# Patient Record
Sex: MALE | Race: WHITE | Employment: FULL TIME
[De-identification: names, ages, dates, MRNs, and addresses within clinical notes are randomized per-mention and may not be internally consistent; named-entity substitution may affect disease eponyms.]

---

## 2024-11-20 ENCOUNTER — TELEPHONE (OUTPATIENT)
Facility: HOSPITAL | Age: 64
End: 2024-11-20

## 2024-11-22 ENCOUNTER — HOSPITAL ENCOUNTER (OUTPATIENT)
Facility: HOSPITAL | Age: 64
Setting detail: RECURRING SERIES
Discharge: HOME | End: 2024-11-25
Payer: OTHER GOVERNMENT

## 2024-11-22 PROCEDURE — 97110 THERAPEUTIC EXERCISES: CPT

## 2024-11-22 PROCEDURE — 97161 PT EVAL LOW COMPLEX 20 MIN: CPT

## 2024-11-22 NOTE — PROGRESS NOTES
PHYSICAL THERAPY Upper Extremity Evaluation/Daily Note     Patient Name: George Sanchez    Date: 2024    : 1960  Insurance: Payor: [employer] EAST / Plan:  EAST PRIME / Product Type: *No Product type* /      Patient  verified yes     Visit #   Current / Total 1 16   Time   In / Out 1:32 2:20   Pain   In / Out 0 at rest 0 at rest   Subjective Functional Status/Changes: Pt is a 64 year old male presenting with c/o left arm pain between the shoulder and elbow with onset of approximately 4 months ago potentially picking up grand child. Pt is right handed so has compensated over time by using the right hand more. Describes pain as sharp and tingling with certain movements including reaching overhead and behind the back. Rest and pressure relieve the pain. Denies exacerbation with neck movements and denies numbness/tingling below the elbow although the pain he feels is tingly above the elbow at times and relieves with rubbing it manually.   Changes to:  Meds, Allergies, Med Hx, Sx Hx?  If yes, update Summary List no       TREATMENT AREA =  Pain in left shoulder [M25.512]    SUBJECTIVE  PLOF: functionally independent, no AD, mildly active lifestyle including daily walking  Limitations to PLOF: Restricted in ability to perform ADLs without pain including overhead activities  Mechanism of Injury: See above  Current symptoms/Complaints: Pt did not provide specific pain levels at best and worst; pt reports they are unable to sleep through the night secondary to pain when laying on the affected side  Previous Treatment/Compliance: PCP and referral for PT  Imaging/Diagnostic Testing: Denies imaging at this time  PMHx/Surgical Hx: Osteopenia and high cholesterol, [place]  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 
Status: good  Rehabilitation Potential: good    Short Term Goals: To be accomplished in 8 treatments:  Patient will be independent and compliant with HEP to progress toward goals and restore functional mobility.   Eval Status: issued at eval     Patient will demonstrate 35 deg of AROM cervical SB bilat to improve functional mobility for activities such as driving    Eval Status: SB: left 15, right 32     Pt will demonstrate 160 deg of left shoulder flexion PROM to aid in functional mechanics for overhead activities.  Eval Status:   152 pain limited at end range (left) 165 (right)         Long Term Goals: To be accomplished in 16 treatments:  Patient will improve QDI score by 19% points to indicate significant improvement in functional status.  Eval Status: QDI 20.45%     Pt will demonstrate 150 deg of left shoulder abduction AROM to aid in functional mechanics for ambulation/ADLs.  Eval Status:   ABD 90 pain left lateral shoulder and upper arm radiating towards elbow (left) 150  (right)         Pt will have 5/5 global shoulder strength to return to goals of participation in overhead activities.  Eval Status:   Shoulder Left (0-5) Right (0-5)   Shoulder Flexion 4 pain 4+   Shoulder Ext       Shoulder ABD 4- pain 4   Shoulder ADD       Shoulder IR @ 0 4+ 4+   Shoulder IR @ 90       Shoulder ER @ 0 4- pain 4+     Frequency / Duration: Patient to be seen 2 times per week for 15 treatments    Patient/ Caregiver education and instruction: Diagnosis, prognosis, self care, activity modification, and exercises     [x]  Plan of care has been reviewed with PTA    Certification Period: N/A    All care provided by SPT was directed and supervised by Evan Yeager DPT with pt consent.      Marline Burr, [facility] 11/22/2024 3:45 PM  _____________________________________________________________________  I certify that the above Therapy Services are being furnished while the patient is under my care. I agree with the treatment plan and

## 2024-11-25 ENCOUNTER — HOSPITAL ENCOUNTER (OUTPATIENT)
Facility: HOSPITAL | Age: 64
Setting detail: RECURRING SERIES
Discharge: HOME | End: 2024-11-28
Payer: OTHER GOVERNMENT

## 2024-11-25 PROCEDURE — 97112 NEUROMUSCULAR REEDUCATION: CPT

## 2024-11-25 PROCEDURE — 97110 THERAPEUTIC EXERCISES: CPT

## 2024-11-25 PROCEDURE — 97530 THERAPEUTIC ACTIVITIES: CPT

## 2024-11-25 NOTE — PROGRESS NOTES
PHYSICAL / OCCUPATIONAL THERAPY - DAILY TREATMENT NOTE     Patient Name: George Sanchez    Date: 2024    : 1960  Insurance: Payor:  EAST / Plan:  EAST PRIME / Product Type: *No Product type* /      Patient  verified Yes     Visit #   Current / Total 2 15   Time   In / Out 5:27 6:10   Pain   In / Out 3 3   Subjective Functional Status/Changes: \"My arm is feeling ok, but there was two exercises I was doing at home from the HEP that aggravated it.\"   Changes to:  Allergies, Med Hx, Sx Hx?   no       TREATMENT AREA =  Pain in left arm [M79.602]    If an interpreting service is utilized for treatment of this patient, the contents of this document represent the material reviewed with the patient via the .     OBJECTIVE      Therapeutic Procedures:  Tx Min Billable or 1:1 Min (if diff from Tx Min) Procedure, Rationale, Specifics   25  14977 Therapeutic Exercise (timed):  increase ROM, strength, coordination, balance, and proprioception to improve patient's ability to progress to PLOF and address remaining functional goals. (see flow sheet as applicable)    Details if applicable:       10  48998 Neuromuscular Re-Education (timed):  improve balance, coordination, kinesthetic sense, posture, core stability and proprioception to improve patient's ability to develop conscious control of individual muscles and awareness of position of extremities in order to progress to PLOF and address remaining functional goals. (see flow sheet as applicable)    Details if applicable:     8  80546 Therapeutic Activity (timed):  use of dynamic activities replicating functional movements to increase ROM, strength, coordination, balance, and proprioception in order to improve patient's ability to progress to PLOF and address remaining functional goals.  (see flow sheet as applicable)     Details if applicable:     43  [facility] Totals Reminder: bill using total billable min of TIMED therapeutic procedures

## 2024-12-03 ENCOUNTER — HOSPITAL ENCOUNTER (OUTPATIENT)
Facility: HOSPITAL | Age: 64
Setting detail: RECURRING SERIES
Discharge: HOME | End: 2024-12-06
Payer: OTHER GOVERNMENT

## 2024-12-03 PROCEDURE — 97110 THERAPEUTIC EXERCISES: CPT

## 2024-12-03 PROCEDURE — 97112 NEUROMUSCULAR REEDUCATION: CPT

## 2024-12-03 PROCEDURE — 97530 THERAPEUTIC ACTIVITIES: CPT

## 2024-12-03 NOTE — PROGRESS NOTES
PHYSICAL / OCCUPATIONAL THERAPY - DAILY TREATMENT NOTE     Patient Name: George Sanchez    Date: 12/3/2024    : 1960  Insurance: Payor:  EAST / Plan: [employer] EAST PRIME / Product Type: *No Product type* /      Patient  verified Yes     Visit #   Current / Total 3 15   Time   In / Out 5:30 6:10   Pain   In / Out 2 1   Subjective Functional Status/Changes: Pt reports no change in symptoms at this time.    Changes to:  Allergies, Med Hx, Sx Hx?   no       TREATMENT AREA =  Pain in left arm [M79.602]    If an interpreting service is utilized for treatment of this patient, the contents of this document represent the material reviewed with the patient via the .     OBJECTIVE      Therapeutic Procedures:  Tx Min Billable or 1:1 Min (if diff from Tx Min) Procedure, Rationale, Specifics   20  17677 Therapeutic Exercise (timed):  increase ROM, strength, coordination, balance, and proprioception to improve patient's ability to progress to PLOF and address remaining functional goals. (see flow sheet as applicable)    Details if applicable:       10  33036 Neuromuscular Re-Education (timed):  improve balance, coordination, kinesthetic sense, posture, core stability and proprioception to improve patient's ability to develop conscious control of individual muscles and awareness of position of extremities in order to progress to PLOF and address remaining functional goals. (see flow sheet as applicable)    Details if applicable:     10  55653 Therapeutic Activity (timed):  use of dynamic activities replicating functional movements to increase ROM, strength, coordination, balance, and proprioception in order to improve patient's ability to progress to PLOF and address remaining functional goals.  (see flow sheet as applicable)     Details if applicable:     40  [facility] Totals Reminder: bill using total billable min of TIMED therapeutic procedures (example: do not include dry needle or estim unattended,

## 2024-12-05 ENCOUNTER — HOSPITAL ENCOUNTER (OUTPATIENT)
Facility: HOSPITAL | Age: 64
Setting detail: RECURRING SERIES
Discharge: HOME | End: 2024-12-08
Payer: OTHER GOVERNMENT

## 2024-12-05 PROCEDURE — 97110 THERAPEUTIC EXERCISES: CPT

## 2024-12-05 PROCEDURE — 97530 THERAPEUTIC ACTIVITIES: CPT

## 2024-12-05 PROCEDURE — 97112 NEUROMUSCULAR REEDUCATION: CPT

## 2024-12-05 NOTE — PROGRESS NOTES
PHYSICAL / OCCUPATIONAL THERAPY - DAILY TREATMENT NOTE (updated )    Patient Name: George Sanchez    Date: 2024    : 1960  Insurance: Payor: [employer] EAST / Plan: [employer] EAST PRIME / Product Type: *No Product type* /      Patient  verified Yes     Visit #   Current / Total 4 15   Time   In / Out 0840 0925   Pain   In / Out 2 1   Subjective Functional Status/Changes: \"The left arm is feeling about the same. It still hurts to move it out to the side.\"   Changes to:  Meds, Allergies, Med Hx, Sx Hx?  If yes, update Summary List no       TREATMENT AREA =  Pain in left arm [M79.602]    If an interpreting service is utilized for treatment of this patient, the contents of this document represent the material reviewed with the patient via the .     OBJECTIVE    Therapeutic Procedures:  Tx Min Billable or 1:1 Min (if diff from Tx Min) Procedure, Rationale, Specifics   20  94172 Therapeutic Exercise (timed):  increase ROM, strength, coordination, balance, and proprioception to improve patient's ability to progress to PLOF and address remaining functional goals. (see flow sheet as applicable)     Details if applicable:       15  55078 Therapeutic Activity (timed):  use of dynamic activities replicating functional movements to increase ROM, strength, coordination, balance, and proprioception in order to improve patient's ability to progress to PLOF and address remaining functional goals.  (see flow sheet as applicable)     Details if applicable:     10  25416 Neuromuscular Re-Education (timed):  improve balance, coordination, kinesthetic sense, posture, core stability and proprioception to improve patient's ability to develop conscious control of individual muscles and awareness of position of extremities in order to progress to PLOF and address remaining functional goals. (see flow sheet as applicable)     Details if applicable:     45  [facility] Totals Reminder: bill using total billable min of

## 2024-12-16 ENCOUNTER — HOSPITAL ENCOUNTER (OUTPATIENT)
Facility: HOSPITAL | Age: 64
Setting detail: RECURRING SERIES
Discharge: HOME | End: 2024-12-19
Payer: OTHER GOVERNMENT

## 2024-12-16 PROCEDURE — 97112 NEUROMUSCULAR REEDUCATION: CPT

## 2024-12-16 PROCEDURE — 97530 THERAPEUTIC ACTIVITIES: CPT

## 2024-12-16 PROCEDURE — 97110 THERAPEUTIC EXERCISES: CPT

## 2024-12-16 NOTE — PROGRESS NOTES
min of TIMED therapeutic procedures (example: do not include dry needle or estim unattended, both untimed codes, in totals to left)  8-22 min = 1 unit; 23-37 min = 2 units; 38-52 min = 3 units; 53-67 min = 4 units; 68-82 min = 5 units   Total Total       TOTAL TREATMENT TIME:       46       [x]  Patient Education billed concurrently with other procedures   [x] Review HEP    [] Progressed/Changed HEP, detail:    [] Other detail:       Objective Information/Functional Measures/Assessment    Pt tolerated treatment fairly well though still noting some discomfort with left shoulder sidelying abd AROM requiring technique modification. Progressed loading with TB chest press with minimal lateral shoulder soreness. Reduction in pain noted post treatment. Advised pt to continue one month scheduling through into January.    Patient will continue to benefit from skilled PT / OT services to modify and progress therapeutic interventions, analyze and address functional mobility deficits, analyze and address ROM deficits, analyze and address strength deficits, analyze and address soft tissue restrictions, analyze and cue for proper movement patterns, analyze and modify for postural abnormalities, analyze and address imbalance/dizziness, and instruct in home and community integration to address functional deficits and attain remaining goals.    Progress toward goals / Updated goals:  []  See Progress Note/Recertification    Short Term Goals: To be accomplished in 8 treatments:  Patient will be independent and compliant with HEP to progress toward goals and restore functional mobility.   Eval Status: issued at eval  Current: Pt demonstrates daily compliance with HEP 11/25/2024     Patient will demonstrate 35 deg of AROM cervical SB bilat to improve functional mobility for activities such as driving    Eval Status: SB: left 15, right 32  Current: SB left 18, right 32  12/5/24     Pt will demonstrate 160 deg of left shoulder flexion

## 2024-12-20 ENCOUNTER — HOSPITAL ENCOUNTER (OUTPATIENT)
Facility: HOSPITAL | Age: 64
Setting detail: RECURRING SERIES
Discharge: HOME | End: 2024-12-23
Payer: OTHER GOVERNMENT

## 2024-12-20 PROCEDURE — 97112 NEUROMUSCULAR REEDUCATION: CPT

## 2024-12-20 PROCEDURE — 97530 THERAPEUTIC ACTIVITIES: CPT

## 2024-12-20 PROCEDURE — 97110 THERAPEUTIC EXERCISES: CPT

## 2024-12-20 NOTE — PROGRESS NOTES
PHYSICAL / OCCUPATIONAL THERAPY - DAILY TREATMENT NOTE (updated )    Patient Name: George Sanchez    Date: 2024    : 1960  Insurance: Payor:  EAST / Plan:  EAST PRIME / Product Type: *No Product type* /      Patient  verified Yes     Visit #   Current / Total 6 15   Time   In / Out 2:50 pm 3:40 pm    Pain   In / Out 2 0   Subjective Functional Status/Changes: Pt reports soreness in his shoulder but no neck pain. Notes limited change in shoulder pain with sporadic pain with reaching at times/   Changes to:  Meds, Allergies, Med Hx, Sx Hx?  If yes, update Summary List no       TREATMENT AREA =  Pain in left arm [M79.602]    If an interpreting service is utilized for treatment of this patient, the contents of this document represent the material reviewed with the patient via the .     OBJECTIVE    Therapeutic Procedures:  Tx Min Billable or 1:1 Min (if diff from Tx Min) Procedure, Rationale, Specifics   25  13939 Therapeutic Exercise (timed):  increase ROM, strength, coordination, balance, and proprioception to improve patient's ability to progress to PLOF and address remaining functional goals. (see flow sheet as applicable)     Details if applicable:       15  41469 Therapeutic Activity (timed):  use of dynamic activities replicating functional movements to increase ROM, strength, coordination, balance, and proprioception in order to improve patient's ability to progress to PLOF and address remaining functional goals.  (see flow sheet as applicable)     Details if applicable:     10  50587 Neuromuscular Re-Education (timed):  improve balance, coordination, kinesthetic sense, posture, core stability and proprioception to improve patient's ability to develop conscious control of individual muscles and awareness of position of extremities in order to progress to PLOF and address remaining functional goals. (see flow sheet as applicable)     Details if applicable:     50  [facility]

## 2025-01-03 ENCOUNTER — HOSPITAL ENCOUNTER (OUTPATIENT)
Facility: HOSPITAL | Age: 65
Setting detail: RECURRING SERIES
Discharge: HOME | End: 2025-01-06
Payer: MEDICARE

## 2025-01-03 PROCEDURE — 97110 THERAPEUTIC EXERCISES: CPT

## 2025-01-03 PROCEDURE — 97112 NEUROMUSCULAR REEDUCATION: CPT

## 2025-01-03 PROCEDURE — 97530 THERAPEUTIC ACTIVITIES: CPT

## 2025-01-03 NOTE — PROGRESS NOTES
PHYSICAL / OCCUPATIONAL THERAPY - DAILY TREATMENT NOTE (updated )    Patient Name: George Sanchez    Date: 1/3/2025    : 1960  Insurance: Payor: MEDICARE / Plan: MEDICARE PART A AND B / Product Type: *No Product type* /      Patient  verified Yes     Visit #   Current / Total 7 15   Time   In / Out 2:50 pm 3:32 pm   Pain   In / Out 0 0   Subjective Functional Status/Changes: Pt reports his shoulder had been doing better until a few days ago when he notes he moved a certain way and some of the pain came back. Still notes intermittent lateral left shoulder pain with certain movements of the arm.   Changes to:  Meds, Allergies, Med Hx, Sx Hx?  If yes, update Summary List no       TREATMENT AREA =  Pain in left arm [M79.602]    If an interpreting service is utilized for treatment of this patient, the contents of this document represent the material reviewed with the patient via the .     OBJECTIVE    Therapeutic Procedures:  Tx Min Billable or 1:1 Min (if diff from Tx Min) Procedure, Rationale, Specifics   17 13 [id number] Therapeutic Exercise (timed):  increase ROM, strength, coordination, balance, and proprioception to improve patient's ability to progress to PLOF and address remaining functional goals. (see flow sheet as applicable)     Details if applicable:       15  31333 Therapeutic Activity (timed):  use of dynamic activities replicating functional movements to increase ROM, strength, coordination, balance, and proprioception in order to improve patient's ability to progress to PLOF and address remaining functional goals.  (see flow sheet as applicable)     Details if applicable:     10  05825 Neuromuscular Re-Education (timed):  improve balance, coordination, kinesthetic sense, posture, core stability and proprioception to improve patient's ability to develop conscious control of individual muscles and awareness of position of extremities in order to progress to PLOF and address remaining 
    Patient would benefit from the continuation of skilled rehab interventions for functional progress to achieving above stated clinically significant goals.    New Certification Period: 1/3/25 - 4/3/25     Summary of Care/ Key Functional Changes: Pt has attended 7 PT appts for left lateral shoulder pain demonstrating some improvements in shoulder ROM and shoulder flexion and ER strength though remains limited with left shoulder abduction ROM due to onset of sharp pain with elevation to ~90 degrees. Pt will continue to benefit from skilled PT with plan to gradually progress loading as tolerated, but will advise follow up with orthopedics if pain and weakness with shoulder abd persist.      Progress note completed at therapist's earliest opportunity due to pt absence from care.     ASSESSMENT/RECOMMENDATIONS:   Patient would benefit from the continuation of skilled rehab interventions for functional progress to achieving above stated clinically significant goals. Continue per plan of care.      Thank you for this referral.   Evan Yeager, PT 1/3/2025 3:16 PM                     _________________________________________________________________  I certify that the above Therapy Services are being furnished while the patient is under my care. I agree with the treatment plan and certify that this therapy is necessary.    [] I have read the above and request that my patient continue as recommended.  [] I have read the above report and request that my patient continue therapy with the following changes/special instructions: _______________________________________  [] I have read the above report and request that my patient be discharged from therapy    Physician's Signature:____________________ Date:_________ TIME:________    ** Signature, Date and Time must be completed for valid certification **    Please sign and return to   [facility] [address] [place], [place]

## 2025-01-06 ENCOUNTER — HOSPITAL ENCOUNTER (OUTPATIENT)
Facility: HOSPITAL | Age: 65
Setting detail: RECURRING SERIES
Discharge: HOME | End: 2025-01-09
Payer: MEDICARE

## 2025-01-06 PROCEDURE — 97110 THERAPEUTIC EXERCISES: CPT

## 2025-01-06 PROCEDURE — 97530 THERAPEUTIC ACTIVITIES: CPT

## 2025-01-06 PROCEDURE — 97112 NEUROMUSCULAR REEDUCATION: CPT

## 2025-01-07 NOTE — PROGRESS NOTES
PHYSICAL / OCCUPATIONAL THERAPY - DAILY TREATMENT NOTE     Patient Name: George Sanchez    Date: 2025    : 1960  Insurance: Payor: MEDICARE / Plan: MEDICARE PART A AND B / Product Type: *No Product type* /      Patient  verified Yes     Visit #   Current / Total 8 15   Time   In / Out 5:30 pm 6:15 pm   Pain   In / Out 2 0   Subjective Functional Status/Changes: Pt reports minimal soreness in his shoulder but reports it is doing better.   Changes to:  Allergies, Med Hx, Sx Hx?   no       TREATMENT AREA =  Pain in left arm [M79.602]    If an interpreting service is utilized for treatment of this patient, the contents of this document represent the material reviewed with the patient via the .     OBJECTIVE    Therapeutic Procedures:  Tx Min Billable or 1:1 Min (if diff from Tx Min) Procedure, Rationale, Specifics   15   13424 Therapeutic Exercise (timed):  increase ROM, strength, coordination, balance, and proprioception to improve patient's ability to progress to PLOF and address remaining functional goals. (see flow sheet as applicable)    Details if applicable:       15  45941 Neuromuscular Re-Education (timed):  improve balance, coordination, kinesthetic sense, posture, core stability and proprioception to improve patient's ability to develop conscious control of individual muscles and awareness of position of extremities in order to progress to PLOF and address remaining functional goals. (see flow sheet as applicable)    Details if applicable:     15  25621 Therapeutic Activity (timed):  use of dynamic activities replicating functional movements to increase ROM, strength, coordination, balance, and proprioception in order to improve patient's ability to progress to PLOF and address remaining functional goals.  (see flow sheet as applicable)     Details if applicable:           Details if applicable:            Details if applicable:     45 45 [facility] Totals Reminder: bill using total

## 2025-01-09 ENCOUNTER — HOSPITAL ENCOUNTER (OUTPATIENT)
Facility: HOSPITAL | Age: 65
Setting detail: RECURRING SERIES
Discharge: HOME | End: 2025-01-12
Payer: MEDICARE

## 2025-01-09 PROCEDURE — 97530 THERAPEUTIC ACTIVITIES: CPT

## 2025-01-09 PROCEDURE — 97110 THERAPEUTIC EXERCISES: CPT

## 2025-01-09 PROCEDURE — 97112 NEUROMUSCULAR REEDUCATION: CPT

## 2025-01-09 NOTE — PROGRESS NOTES
PHYSICAL / OCCUPATIONAL THERAPY - DAILY TREATMENT NOTE (updated )    Patient Name: George Sanchez    Date: 2025    : 1960  Insurance: Payor: MEDICARE / Plan: MEDICARE PART A AND B / Product Type: *No Product type* /      Patient  verified Yes     Visit #   Current / Total 9 15   Time   In / Out 1726 1815   Pain   In / Out 3 1   Subjective Functional Status/Changes: \"I had a leak in the refrigerator at home and had to use my shoulder a lot to clean it up so it is more sore today.\"   Changes to:  Meds, Allergies, Med Hx, Sx Hx?  If yes, update Summary List no       TREATMENT AREA =  Pain in left arm [M79.602]    If an interpreting service is utilized for treatment of this patient, the contents of this document represent the material reviewed with the patient via the .     OBJECTIVE    Therapeutic Procedures:  Tx Min Billable or 1:1 Min (if diff from Tx Min) Procedure, Rationale, Specifics   19  11649 Therapeutic Exercise (timed):  increase ROM, strength, coordination, balance, and proprioception to improve patient's ability to progress to PLOF and address remaining functional goals. (see flow sheet as applicable)     Details if applicable:       15  65787 Therapeutic Activity (timed):  use of dynamic activities replicating functional movements to increase ROM, strength, coordination, balance, and proprioception in order to improve patient's ability to progress to PLOF and address remaining functional goals.  (see flow sheet as applicable)     Details if applicable:     15  84270 Neuromuscular Re-Education (timed):  improve balance, coordination, kinesthetic sense, posture, core stability and proprioception to improve patient's ability to develop conscious control of individual muscles and awareness of position of extremities in order to progress to PLOF and address remaining functional goals. (see flow sheet as applicable)     Details if applicable:     49  [facility] Totals Reminder: bill

## 2025-01-13 ENCOUNTER — HOSPITAL ENCOUNTER (OUTPATIENT)
Facility: HOSPITAL | Age: 65
Setting detail: RECURRING SERIES
Discharge: HOME | End: 2025-01-16
Payer: MEDICARE

## 2025-01-13 PROCEDURE — 97110 THERAPEUTIC EXERCISES: CPT

## 2025-01-13 PROCEDURE — 97112 NEUROMUSCULAR REEDUCATION: CPT

## 2025-01-13 PROCEDURE — 97530 THERAPEUTIC ACTIVITIES: CPT

## 2025-01-13 NOTE — PROGRESS NOTES
not include dry needle or estim unattended, both untimed codes, in totals to left)  8-22 min = 1 unit; 23-37 min = 2 units; 38-52 min = 3 units; 53-67 min = 4 units; 68-82 min = 5 units   Total Total       TOTAL TREATMENT TIME:       55       [x]  Patient Education billed concurrently with other procedures   [x] Review HEP    [] Progressed/Changed HEP, detail:    [] Other detail:       Objective Information/Functional Measures/Assessment    Patient required fewer cues for activity pacing. He did require steady cues to recall exercise mechanics an parameters. Will advance exercise next visit as tolerated.     Patient will continue to benefit from skilled PT services to modify and progress therapeutic interventions, analyze and address functional mobility deficits, analyze and address ROM deficits, analyze and address strength deficits, analyze and address soft tissue restrictions, analyze and cue for proper movement patterns, analyze and modify for postural abnormalities, analyze and address imbalance/dizziness, and instruct in home and community integration to address functional deficits and attain remaining goals.    Progress toward goals / Updated goals:  []  See Progress Note/Recertification    Short Term Goals: To be accomplished in 8 treatments:  Patient will be independent and compliant with HEP to progress toward goals and restore functional mobility.   Eval Status: issued at eval  Current: In-progress, developing consistency, 1/13/2025     Patient will demonstrate 35 deg of AROM cervical SB bilat to improve functional mobility for activities such as driving    Eval Status: SB: left 15, right 32  Current: improved SB left 22, right 33 1/3/25     Pt will demonstrate 160 deg of left shoulder flexion PROM to aid in functional mechanics for overhead activities.  Eval Status:   152 pain limited at end range (left) 165 (right)    Current: left 158 degrees 1/3/25     Long Term Goals: To be accomplished in 16

## 2025-01-16 ENCOUNTER — HOSPITAL ENCOUNTER (OUTPATIENT)
Facility: HOSPITAL | Age: 65
Setting detail: RECURRING SERIES
Discharge: HOME | End: 2025-01-19
Payer: MEDICARE

## 2025-01-16 PROCEDURE — 97110 THERAPEUTIC EXERCISES: CPT

## 2025-01-16 PROCEDURE — 97112 NEUROMUSCULAR REEDUCATION: CPT

## 2025-01-16 PROCEDURE — 97530 THERAPEUTIC ACTIVITIES: CPT

## 2025-01-16 PROCEDURE — 97140 MANUAL THERAPY 1/> REGIONS: CPT

## 2025-01-16 NOTE — PROGRESS NOTES
PHYSICAL / OCCUPATIONAL THERAPY - DAILY TREATMENT NOTE (updated )    Patient Name: George Sanchez    Date: 2025    : 1960  Insurance: Payor: MEDICARE / Plan: MEDICARE PART A AND B / Product Type: *No Product type* /      Patient  verified Yes     Visit #   Current / Total 11 15   Time   In / Out 1720 1823   Pain   In / Out 2 1   Subjective Functional Status/Changes: \"The shoulder is steadily getting better. I am noticing I can do more around the house without it getting aggravated.\"   Changes to:  Meds, Allergies, Med Hx, Sx Hx?  If yes, update Summary List no       TREATMENT AREA =  Pain in left arm [M79.602]    If an interpreting service is utilized for treatment of this patient, the contents of this document represent the material reviewed with the patient via the .     OBJECTIVE    Therapeutic Procedures:  Tx Min Billable or 1:1 Min (if diff from Tx Min) Procedure, Rationale, Specifics   25 20 [id number] Therapeutic Exercise (timed):  increase ROM, strength, coordination, balance, and proprioception to improve patient's ability to progress to PLOF and address remaining functional goals. (see flow sheet as applicable)     Details if applicable:       15  04211 Therapeutic Activity (timed):  use of dynamic activities replicating functional movements to increase ROM, strength, coordination, balance, and proprioception in order to improve patient's ability to progress to PLOF and address remaining functional goals.  (see flow sheet as applicable)     Details if applicable:     8  37651 Manual Therapy (timed):  decrease pain, increase ROM, and increase tissue extensibility to improve patient's ability to progress to PLOF and address remaining functional goals.  The manual therapy interventions were performed at a separate and distinct time from the therapeutic activities interventions . Details:     Details if applicable:  supine left glenohumeral joint mobilizations Grade III-IV

## 2025-01-20 ENCOUNTER — HOSPITAL ENCOUNTER (OUTPATIENT)
Facility: HOSPITAL | Age: 65
Setting detail: RECURRING SERIES
Discharge: HOME | End: 2025-01-23
Payer: MEDICARE

## 2025-01-20 PROCEDURE — 97112 NEUROMUSCULAR REEDUCATION: CPT

## 2025-01-20 PROCEDURE — 97530 THERAPEUTIC ACTIVITIES: CPT

## 2025-01-20 PROCEDURE — 97110 THERAPEUTIC EXERCISES: CPT

## 2025-01-20 NOTE — PROGRESS NOTES
balance, coordination, kinesthetic sense, posture, core stability and proprioception to improve patient's ability to develop conscious control of individual muscles and awareness of position of extremities in order to progress to PLOF and address remaining functional goals. (see flow sheet as applicable)     Details if applicable:     42 38 [facility] Totals Reminder: bill using total billable min of TIMED therapeutic procedures (example: do not include dry needle or estim unattended, both untimed codes, in totals to left)  8-22 min = 1 unit; 23-37 min = 2 units; 38-52 min = 3 units; 53-67 min = 4 units; 68-82 min = 5 units   Total Total       TOTAL TREATMENT TIME:       42       [x]  Patient Education billed concurrently with other procedures   [x] Review HEP    [] Progressed/Changed HEP, detail:    [] Other detail:       Objective Information/Functional Measures/Assessment    Patient reporting improved overall functional activity tolerance in home setting but still notes exacerbation of symptoms with active and resisted left shoulder abduction at mid-range indicative of impingement syndrome. Pt requires occasional cueing for technique with exercise. Added 90/90 ER with light resistance with fatigue noted first set but onset of low grade shoulder discomfort during start of second set so held further performance. Relief with inf GHJ distraction.    Patient will continue to benefit from skilled PT / OT services to modify and progress therapeutic interventions, analyze and address functional mobility deficits, analyze and address ROM deficits, analyze and address strength deficits, analyze and address soft tissue restrictions, analyze and cue for proper movement patterns, analyze and modify for postural abnormalities, analyze and address imbalance/dizziness, and instruct in home and community integration to address functional deficits and attain remaining goals.    Progress toward goals / Updated goals:  []  See Progress

## 2025-01-24 ENCOUNTER — TELEPHONE (OUTPATIENT)
Facility: HOSPITAL | Age: 65
End: 2025-01-24

## 2025-01-24 ENCOUNTER — HOSPITAL ENCOUNTER (OUTPATIENT)
Facility: HOSPITAL | Age: 65
Setting detail: RECURRING SERIES
Discharge: HOME | End: 2025-01-27
Payer: MEDICARE

## 2025-01-24 PROCEDURE — 97140 MANUAL THERAPY 1/> REGIONS: CPT

## 2025-01-24 PROCEDURE — 97110 THERAPEUTIC EXERCISES: CPT

## 2025-01-24 PROCEDURE — 97530 THERAPEUTIC ACTIVITIES: CPT

## 2025-01-24 NOTE — PROGRESS NOTES
PHYSICAL / OCCUPATIONAL THERAPY - DAILY TREATMENT NOTE (updated )    Patient Name: George Sanchez    Date: 2025    : 1960  Insurance: Payor: MEDICARE / Plan: MEDICARE PART A AND B / Product Type: *No Product type* /      Patient  verified Yes     Visit #   Current / Total 13 15   Time   In / Out 3:23 pm 4:04 pm   Pain   In / Out 2 0   Subjective Functional Status/Changes: \"It feels better than the other day. I still get pain when I reach certain ways and I don't know if that's going to go away.\"   Changes to:  Meds, Allergies, Med Hx, Sx Hx?  If yes, update Summary List no       TREATMENT AREA =  Pain in left arm [M79.602]    If an interpreting service is utilized for treatment of this patient, the contents of this document represent the material reviewed with the patient via the .     OBJECTIVE    Therapeutic Procedures:  Tx Min Billable or 1:1 Min (if diff from Tx Min) Procedure, Rationale, Specifics   17 14 08967 Therapeutic Exercise (timed):  increase ROM, strength, coordination, balance, and proprioception to improve patient's ability to progress to PLOF and address remaining functional goals. (see flow sheet as applicable)     Details if applicable:       8 8 02511 Therapeutic Activity (timed):  use of dynamic activities replicating functional movements to increase ROM, strength, coordination, balance, and proprioception in order to improve patient's ability to progress to PLOF and address remaining functional goals.  (see flow sheet as applicable)     Details if applicable:     8  52361 Manual Therapy (timed):  decrease pain, increase ROM, and increase tissue extensibility to improve patient's ability to progress to PLOF and address remaining functional goals.  The manual therapy interventions were performed at a separate and distinct time from the therapeutic activities interventions . Details:     Details if applicable:  supine left glenohumeral joint mobilizations Grade

## 2025-01-27 ENCOUNTER — HOSPITAL ENCOUNTER (OUTPATIENT)
Facility: HOSPITAL | Age: 65
Setting detail: RECURRING SERIES
Discharge: HOME | End: 2025-01-30
Payer: MEDICARE

## 2025-01-27 PROCEDURE — 97112 NEUROMUSCULAR REEDUCATION: CPT

## 2025-01-27 PROCEDURE — 97530 THERAPEUTIC ACTIVITIES: CPT

## 2025-01-27 PROCEDURE — 97110 THERAPEUTIC EXERCISES: CPT

## 2025-01-27 NOTE — PROGRESS NOTES
PHYSICAL / OCCUPATIONAL THERAPY - DAILY TREATMENT NOTE (updated )    Patient Name: George Sanchez    Date: 2025    : 1960  Insurance: Payor: MEDICARE / Plan: MEDICARE PART A AND B / Product Type: *No Product type* /      Patient  verified Yes     Visit #   Current / Total 14 15   Time   In / Out 4:50 pm 5:15 pm   Pain   In / Out 2 1   Subjective Functional Status/Changes: Pt reports his shoulder is stronger but notes he still gets occasional pain with reaching certain directions.   Changes to:  Meds, Allergies, Med Hx, Sx Hx?  If yes, update Summary List no       TREATMENT AREA =  Pain in left arm [M79.602]    If an interpreting service is utilized for treatment of this patient, the contents of this document represent the material reviewed with the patient via the .     OBJECTIVE    Therapeutic Procedures:  Tx Min Billable or 1:1 Min (if diff from Tx Min) Procedure, Rationale, Specifics   21 15 26798 Therapeutic Exercise (timed):  increase ROM, strength, coordination, balance, and proprioception to improve patient's ability to progress to PLOF and address remaining functional goals. (see flow sheet as applicable)     Details if applicable:       8  81092 Therapeutic Activity (timed):  use of dynamic activities replicating functional movements to increase ROM, strength, coordination, balance, and proprioception in order to improve patient's ability to progress to PLOF and address remaining functional goals.  (see flow sheet as applicable)     Details if applicable:     8  87371 Self Care/Home Management (timed):  improve patient knowledge and understanding of home injury/symptom/pain management and    to improve patient's ability to progress to PLOF and address remaining functional goals.  (see flow sheet as applicable)     Education regarding HEP, RTC conservative treatment and considerations for orthopedic follow up   8 8 76437 Neuromuscular Re-Education (timed):  improve balance,

## 2025-01-31 ENCOUNTER — HOSPITAL ENCOUNTER (OUTPATIENT)
Facility: HOSPITAL | Age: 65
Setting detail: RECURRING SERIES
End: 2025-01-31
Payer: MEDICARE

## 2025-01-31 PROCEDURE — 97112 NEUROMUSCULAR REEDUCATION: CPT

## 2025-01-31 PROCEDURE — 97110 THERAPEUTIC EXERCISES: CPT

## 2025-01-31 PROCEDURE — 97530 THERAPEUTIC ACTIVITIES: CPT

## 2025-01-31 NOTE — PROGRESS NOTES
PHYSICAL / OCCUPATIONAL THERAPY - DAILY TREATMENT NOTE (updated )    Patient Name: George Sanchez    Date: 2025    : 1960  Insurance: Payor: MEDICARE / Plan: MEDICARE PART A AND B / Product Type: *No Product type* /      Patient  verified Yes     Visit #   Current / Total 15 15   Time   In / Out 3:20 pm 4:00 pm   Pain   In / Out 0 0   Subjective Functional Status/Changes: Pt reports no pain upon arrival. States he has been doing his HEP without concerns.   Changes to:  Meds, Allergies, Med Hx, Sx Hx?  If yes, update Summary List no       TREATMENT AREA =  Pain in left arm [M79.602]    If an interpreting service is utilized for treatment of this patient, the contents of this document represent the material reviewed with the patient via the .     OBJECTIVE    Therapeutic Procedures:  Tx Min Billable or 1:1 Min (if diff from Tx Min) Procedure, Rationale, Specifics   24 24 88667 Therapeutic Exercise (timed):  increase ROM, strength, coordination, balance, and proprioception to improve patient's ability to progress to PLOF and address remaining functional goals. (see flow sheet as applicable)     Details if applicable:       8  67022 Therapeutic Activity (timed):  use of dynamic activities replicating functional movements to increase ROM, strength, coordination, balance, and proprioception in order to improve patient's ability to progress to PLOF and address remaining functional goals.  (see flow sheet as applicable)     Details if applicable:          8  42177 Neuromuscular Re-Education (timed):  improve balance, coordination, kinesthetic sense, posture, core stability and proprioception to improve patient's ability to develop conscious control of individual muscles and awareness of position of extremities in order to progress to PLOF and address remaining functional goals. (see flow sheet as applicable)     Details if applicable:     40 40 [facility] Totals Reminder: bill using total

## 2025-01-31 NOTE — PROGRESS NOTES
[facility]  [address] [address]  Phone: [phone]   Fax: [phone]    Discharge Summary    Patient name: George Sanchez Start of Care: 2024   Referral source: Marshall Munson DO : 1960               Medical Diagnosis: Pain in left arm [M79.602]      Onset Date:2024               Treatment Diagnosis:  M79.602  LEFT ARM PAIN                                          Prior Hospitalization: see medical history Provider#: [id number]   Medications: Verified on Patient Summary List      Comorbidities: Osteopenia and high cholesterol, [place]      Prior Level of Function: functionally independent, no AD, mildly active lifestyle including daily walking     Visits from Start of Care: 15    Missed Visits: 0    Reporting Period : 24 to 25    Goals/Measure of Progress:  Short Term Goals: To be accomplished in 8 treatments:  Patient will be independent and compliant with HEP to progress toward goals and restore functional mobility.   Eval Status: issued at [facility]  Current: updated and reviewed HEP 25     Patient will demonstrate 35 deg of AROM cervical SB bilat to improve functional mobility for activities such as driving    Eval Status: SB: left 15, right 32  Current: improved SB left 29, right 35            25     Pt will demonstrate 160 deg of left shoulder flexion PROM to aid in functional mechanics for overhead activities.  Eval Status:   152 pain limited at end range (left) 165 (right)    Current: left 161 degrees met 25     Long Term Goals: To be accomplished in 16 treatments:  Patient will improve QDI score by 19% points to indicate significant improvement in functional status.  Eval Status: QDI 20.45%  Current: QDI 22.7% 25 pt left without completing 25     Pt will demonstrate 150 deg of left shoulder abduction AROM to aid in functional mechanics for ambulation/ADLs.  Eval Status:   ABD 90 pain left lateral shoulder